# Patient Record
Sex: FEMALE | Race: WHITE
[De-identification: names, ages, dates, MRNs, and addresses within clinical notes are randomized per-mention and may not be internally consistent; named-entity substitution may affect disease eponyms.]

---

## 2019-09-10 ENCOUNTER — HOSPITAL ENCOUNTER (EMERGENCY)
Dept: HOSPITAL 47 - EC | Age: 34
Discharge: HOME | End: 2019-09-10
Payer: COMMERCIAL

## 2019-09-10 VITALS
HEART RATE: 81 BPM | RESPIRATION RATE: 18 BRPM | TEMPERATURE: 98.2 F | DIASTOLIC BLOOD PRESSURE: 68 MMHG | SYSTOLIC BLOOD PRESSURE: 104 MMHG

## 2019-09-10 DIAGNOSIS — M70.21: Primary | ICD-10-CM

## 2019-09-10 DIAGNOSIS — F17.200: ICD-10-CM

## 2019-09-10 PROCEDURE — 99283 EMERGENCY DEPT VISIT LOW MDM: CPT

## 2019-09-10 NOTE — XR
PROCEDURE: XR elbow complete RT - 3V

DATE AND TIME: 9/10/2019 9:21 PM

 

CLINICAL INDICATION: PHH; pain, redness, swelling

 

TECHNIQUE: Department protocol

 

COMPARISON: None

 

FINDINGS: Bones and joints have normal appearance.

 

There is prominent focal soft tissue swelling over the olecranon. No soft tissue emphysema.

 

IMPRESSION:

PROMINENT POSTERIOR SOFT TISSUE SWELLING.